# Patient Record
Sex: FEMALE | Race: BLACK OR AFRICAN AMERICAN | NOT HISPANIC OR LATINO | URBAN - METROPOLITAN AREA
[De-identification: names, ages, dates, MRNs, and addresses within clinical notes are randomized per-mention and may not be internally consistent; named-entity substitution may affect disease eponyms.]

---

## 2023-10-06 ENCOUNTER — EMERGENCY (EMERGENCY)
Facility: HOSPITAL | Age: 58
LOS: 1 days | Discharge: HOME | End: 2023-10-06
Attending: EMERGENCY MEDICINE | Admitting: EMERGENCY MEDICINE
Payer: MEDICAID

## 2023-10-06 VITALS
SYSTOLIC BLOOD PRESSURE: 127 MMHG | OXYGEN SATURATION: 100 % | TEMPERATURE: 98 F | RESPIRATION RATE: 18 BRPM | HEART RATE: 64 BPM | DIASTOLIC BLOOD PRESSURE: 84 MMHG

## 2023-10-06 VITALS
SYSTOLIC BLOOD PRESSURE: 125 MMHG | HEART RATE: 79 BPM | RESPIRATION RATE: 18 BRPM | OXYGEN SATURATION: 98 % | TEMPERATURE: 98 F | DIASTOLIC BLOOD PRESSURE: 75 MMHG

## 2023-10-06 DIAGNOSIS — R30.0 DYSURIA: ICD-10-CM

## 2023-10-06 DIAGNOSIS — R50.9 FEVER, UNSPECIFIED: ICD-10-CM

## 2023-10-06 DIAGNOSIS — N93.9 ABNORMAL UTERINE AND VAGINAL BLEEDING, UNSPECIFIED: ICD-10-CM

## 2023-10-06 DIAGNOSIS — K64.4 RESIDUAL HEMORRHOIDAL SKIN TAGS: ICD-10-CM

## 2023-10-06 DIAGNOSIS — R46.0 VERY LOW LEVEL OF PERSONAL HYGIENE: ICD-10-CM

## 2023-10-06 DIAGNOSIS — R10.2 PELVIC AND PERINEAL PAIN: ICD-10-CM

## 2023-10-06 PROCEDURE — 99284 EMERGENCY DEPT VISIT MOD MDM: CPT

## 2023-10-06 PROCEDURE — 99283 EMERGENCY DEPT VISIT LOW MDM: CPT

## 2023-10-06 NOTE — ED PROVIDER NOTE - NS ED ATTENDING STATEMENT MOD
I have seen and examined this patient and fully participated in the care of this patient as the teaching attending.  The service was shared with the [facility].  I reviewed and verified the documentation and independently performed the documented:

## 2023-10-06 NOTE — ED PROVIDER NOTE - PATIENT PORTAL LINK FT
You can access the FollowMyHealth Patient Portal offered by [facility] by registering at the following website: [email or web address]. By joining [employer]’s FollowMyHealth portal, you will also be able to view your health information using other applications (apps) compatible with our system.

## 2023-10-06 NOTE — ED PROVIDER NOTE - NSFOLLOWUPINSTRUCTIONS_ED_ALL_ED_FT
You can follow up with     [facility]  [address] [place] [address]    Warning Signs:  Please call your doctor or nurse practitioner if you experience the following:  *You experience new chest pain, pressure, squeezing or tightness.  *New or worsening cough, shortness of breath, or wheeze.  *If you are vomiting and cannot keep down fluids or your medications.  *You are getting dehydrated due to continued vomiting, diarrhea, or other reasons. Signs of dehydration include dry mouth, rapid heartbeat, or feeling dizzy or faint when standing.  *You see blood or dark/black material when you vomit or have a bowel movement.  *You experience burning when you urinate, have blood in your urine, or experience a discharge.  *Your pain is not improving within 8-12 hours or is not gone within 24 hours. Call or return immediately if your pain is getting worse, changes location, or moves to your chest or back.  *You have shaking chills, or fever greater than 101.5 degrees Fahrenheit or 38 degrees Celsius.  *Any change in your symptoms, or any new symptoms that concern you.

## 2023-10-06 NOTE — ED ADULT NURSE NOTE - OBJECTIVE STATEMENT
Patient aaox4 c/o vaginal pain x 1 month, states felt sharp pain when sleeping. Maintaining patent airway, denies sob/cp/dizziness/n/v/fever/chills. Physician at bedside.

## 2023-10-06 NOTE — ED PROVIDER NOTE - ATTENDING CONTRIBUTION TO CARE
59 yo with vaginal pain for one month, no bleeding, lacerations on exam. Normal white discharge noted. out of window for PEP. no other acute complaints, including si/hi, ah/vh, states comfortable for dc, strict return prec.

## 2023-10-06 NOTE — ED ADULT NURSE NOTE - NSFALLUNIVINTERV_ED_ALL_ED
Bed/Stretcher in lowest position, wheels locked, appropriate side rails in place/Call bell, personal items and telephone in reach/Instruct patient to call for assistance before getting out of bed/chair/stretcher/Non-slip footwear applied when patient is off stretcher/[place] to call system/Physically safe environment - no spills, clutter or unnecessary equipment/Purposeful proactive rounding/Room/bathroom lighting operational, light cord in reach
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
## 2023-10-06 NOTE — ED PROVIDER NOTE - CLINICAL SUMMARY MEDICAL DECISION MAKING FREE TEXT BOX
58 year old female with no PMH presenting with vaginal pain for about one month. Vitals stable. Physical exam no injury appreciated, mild blood and limited clear to white discharge in vaginal canal.

## 2023-11-23 ENCOUNTER — EMERGENCY (EMERGENCY)
Facility: HOSPITAL | Age: 58
LOS: 1 days | Discharge: HOME | End: 2023-11-23
Admitting: EMERGENCY MEDICINE
Payer: MEDICAID

## 2023-11-23 VITALS
OXYGEN SATURATION: 97 % | TEMPERATURE: 98 F | HEIGHT: 66 IN | SYSTOLIC BLOOD PRESSURE: 133 MMHG | WEIGHT: 149.91 LBS | DIASTOLIC BLOOD PRESSURE: 88 MMHG | HEART RATE: 74 BPM | RESPIRATION RATE: 16 BRPM

## 2023-11-23 VITALS
RESPIRATION RATE: 16 BRPM | DIASTOLIC BLOOD PRESSURE: 81 MMHG | TEMPERATURE: 98 F | SYSTOLIC BLOOD PRESSURE: 129 MMHG | OXYGEN SATURATION: 96 % | HEART RATE: 78 BPM

## 2023-11-23 DIAGNOSIS — S93.402A SPRAIN OF UNSPECIFIED LIGAMENT OF LEFT ANKLE, INITIAL ENCOUNTER: ICD-10-CM
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
## 2023-11-23 DIAGNOSIS — M25.572 PAIN IN LEFT ANKLE AND JOINTS OF LEFT FOOT: ICD-10-CM

## 2023-11-23 PROBLEM — Z78.9 OTHER SPECIFIED HEALTH STATUS: Chronic | Status: ACTIVE | Noted: 2023-10-06

## 2023-11-23 PROCEDURE — 99284 EMERGENCY DEPT VISIT MOD MDM: CPT

## 2023-11-23 PROCEDURE — 73610 X-RAY EXAM OF ANKLE: CPT | Mod: 26,LT

## 2023-11-23 PROCEDURE — 73630 X-RAY EXAM OF FOOT: CPT | Mod: 26,LT

## 2023-11-23 RX ORDER — IBUPROFEN 200 MG
600 TABLET ORAL ONCE
Refills: 0 | Status: COMPLETED | OUTPATIENT
Start: 2023-11-23 | End: 2023-11-23

## 2023-11-23 RX ADMIN — Medication 600 MILLIGRAM(S): at 02:44

## 2023-11-23 NOTE — ED ADULT NURSE NOTE - NSFALLUNIVINTERV_ED_ALL_ED
Bed/Stretcher in lowest position, wheels locked, appropriate side rails in place/Call bell, personal items and telephone in reach/Instruct patient to call for assistance before getting out of bed/chair/stretcher/Non-slip footwear applied when patient is off stretcher/[place] to call system/Physically safe environment - no spills, clutter or unnecessary equipment/Purposeful proactive rounding/Room/bathroom lighting operational, light cord in reach

## 2023-11-23 NOTE — ED ADULT NURSE NOTE - OBJECTIVE STATEMENT
Pt. s/p fall. [place] and states her R lower leg hurts. Reports she usually has pain to L foot/leg. Denies head strike or LOC. Food given. Pt. sleeping and resting in to distress.

## 2023-11-23 NOTE — ED ADULT TRIAGE NOTE - CHIEF COMPLAINT QUOTE
Pt. walk in after trip and fall [place]. C/o L. ankle and R. foot pain. Denies head trauma.

## 2023-11-23 NOTE — ED PROVIDER NOTE - OBJECTIVE STATEMENT
59 yo f pw acute onset of L ankle pain aching mild in severity s/p mechanical trip and fall twisted the L ankle while going [place] the subway stairs, no weakness or numbness. No head or neck trauma, no loc.    I have reviewed available current nursing and previous documentation of past medical, surgical, family, and/or social history.

## 2023-11-23 NOTE — ED PROVIDER NOTE - PATIENT PORTAL LINK FT
You can access the FollowMyHealth Patient Portal offered by [facility] by registering at the following website: [email or web address]. By joining [employer]’s FollowMyHealth portal, you will also be able to view your health information using other applications (apps) compatible with our system.

## 2023-11-23 NOTE — ED ADULT NURSE NOTE - DOES PATIENT HAVE ADVANCE DIRECTIVE
Abdomen , soft, nontender, nondistended , no guarding or rigidity , no masses palpable , normal bowel sounds , Liver and Spleen,  no hepatosplenomegaly , liver nontender
unk

## 2023-11-23 NOTE — ED PROVIDER NOTE - CLINICAL SUMMARY MEDICAL DECISION MAKING FREE TEXT BOX
well appearing pt here with L ankle sprain s/p twisting injury to the L ankle mild ttp over the atfl with no swelling or deformity

## 2023-11-23 NOTE — ED PROVIDER NOTE - PHYSICAL EXAMINATION
Physical Exam    Vital Signs: I have reviewed the initial vital signs.  Constitutional: well-appearing, appears stated age  Cardiovascular: regular rate, regular rhythm, well-perfused extremities, DP pulse +2 and equal b/l  Musculoskeletal: supple neck, no lower extremity edema  Integumentary: warm, dry, no rash  Neurologic: LE extremities’ motor and sensory functions grossly intact b/l

## 2023-11-23 NOTE — ED PROVIDER NOTE - NSFOLLOWUPINSTRUCTIONS_ED_ALL_ED_FT
[facility]  [phone]      Ankle Sprain    An ankle sprain is a stretch or tear in a ligament in the ankle. Ligaments are tissues that connect bones to each other.    The two most common types of ankle sprains are:  Inversion sprain. This happens when the foot turns inward and the ankle rolls outward. It affects the ligament on the outside of the foot (lateral ligament).  Eversion sprain. This happens when the foot turns outward and the ankle rolls inward. It affects the ligament on the inner side of the foot (medial ligament).  What are the causes?  This condition is often caused by accidentally rolling or twisting the ankle.    What increases the risk?  This condition is more likely to develop in people who play sports.    What are the signs or symptoms?  Symptoms of this condition include:  Pain in your ankle.  Swelling.  Bruising. Bruising may develop right after you sprain your ankle or 1–2 days later.  Trouble standing or walking, especially when you turn or change directions.  How is this diagnosed?  This condition is diagnosed with a physical exam. During the exam, your health care provider will press on certain parts of your foot and ankle and try to move them in certain ways. X-rays may be taken to see how severe the sprain is and to check for broken bones.    How is this treated?  This condition may be treated with:  A brace. This is used to keep the ankle from moving until it heals.  An elastic bandage. This is used to support the ankle.  Crutches.  Pain medicine.  Surgery. This may be needed if the sprain is severe.  Physical therapy. This may help to improve the range of motion in the ankle.  Follow these instructions at home:    Rest your ankle.  Take over-the-counter and prescription medicines only as told by your health care provider.  For 2–3 days, keep your ankle raised (elevated) above the level of your heart as much as possible.  If directed, apply ice to the area:  Put ice in a plastic bag.  Place a towel between your skin and the bag.  Leave the ice on for 20 minutes, 2–3 times a day.  If you were given a brace:  Wear it as directed.  Remove it to shower or bathe.  Try not to move your ankle much, but wiggle your toes from time to time. This helps to prevent swelling.  If you were given an elastic bandage (dressing):  Remove it to shower or bathe.  Try not to move your ankle much, but wiggle your toes from time to time. This helps to prevent swelling.  Adjust the dressing to make it more comfortable if it feels too tight.  Loosen the dressing if you have numbness or tingling in your foot, or if your foot becomes cold and blue.  If you have crutches, use them as told by your health care provider.  Contact a health care provider if:  You have rapidly increasing bruising or swelling.  Your pain is not relieved with medicine.  Get help right away if:  Your foot or toes become numb or blue.  You have severe pain that gets worse.  Summary  An ankle sprain is a stretch or tear in a ligament in the ankle. Ligaments are tissues that connect bones to each other.  To relieve pain and swelling, place ice on the affected ankle, raise your ankle above the level of your heart, and use an elastic bandage. Also, rest as told by your health care provider.  This information is not intended to replace advice given to you by your health care provider. Make sure you discuss any questions you have with your health care provider.

## 2025-02-18 NOTE — ED ADULT NURSE NOTE - CAS TRG GENERAL AIRWAY, MLM
The patient has been re-examined and I agree with the above assessment or I updated with my findings. Patent